# Patient Record
Sex: FEMALE | URBAN - METROPOLITAN AREA
[De-identification: names, ages, dates, MRNs, and addresses within clinical notes are randomized per-mention and may not be internally consistent; named-entity substitution may affect disease eponyms.]

---

## 2024-09-20 ENCOUNTER — OFFICE VISIT (OUTPATIENT)
Dept: URBAN - METROPOLITAN AREA CLINIC 92 | Facility: CLINIC | Age: 49
End: 2024-09-20

## 2024-09-26 ENCOUNTER — DASHBOARD ENCOUNTERS (OUTPATIENT)
Age: 49
End: 2024-09-26

## 2024-09-26 ENCOUNTER — OFFICE VISIT (OUTPATIENT)
Dept: URBAN - METROPOLITAN AREA CLINIC 25 | Facility: CLINIC | Age: 49
End: 2024-09-26
Payer: COMMERCIAL

## 2024-09-26 VITALS
HEART RATE: 95 BPM | RESPIRATION RATE: 96 BRPM | SYSTOLIC BLOOD PRESSURE: 135 MMHG | TEMPERATURE: 97.9 F | BODY MASS INDEX: 53.92 KG/M2 | DIASTOLIC BLOOD PRESSURE: 84 MMHG | WEIGHT: 293 LBS | HEIGHT: 62 IN

## 2024-09-26 DIAGNOSIS — K90.49 FOOD INTOLERANCE IN ADULT: ICD-10-CM

## 2024-09-26 DIAGNOSIS — R14.0 BLOATING: ICD-10-CM

## 2024-09-26 DIAGNOSIS — R10.33 PERIUMBILICAL PAIN: ICD-10-CM

## 2024-09-26 PROBLEM — 235719002: Status: ACTIVE | Noted: 2024-09-26

## 2024-09-26 PROCEDURE — 99204 OFFICE O/P NEW MOD 45 MIN: CPT | Performed by: INTERNAL MEDICINE

## 2024-09-26 RX ORDER — VALSARTAN 320 MG/1
TABLET, FILM COATED ORAL
Qty: 30 TABLET | Status: ON HOLD | COMMUNITY

## 2024-09-26 RX ORDER — AMLODIPINE BESYLATE 10 MG/1
TABLET ORAL
Qty: 30 TABLET | Status: ACTIVE | COMMUNITY

## 2024-09-26 NOTE — HPI-TODAY'S VISIT:
47 yo pt presents for evaluation of lower abdominal pain. She reports treatment for h. pylori 2019 after egd positive biopsy. Recent h. pylori breath test is negative. She admits to bloating/gas.

## 2025-06-02 ENCOUNTER — TELEPHONE ENCOUNTER (OUTPATIENT)
Dept: URBAN - METROPOLITAN AREA CLINIC 25 | Facility: CLINIC | Age: 50
End: 2025-06-02

## 2025-06-02 ENCOUNTER — OFFICE VISIT (OUTPATIENT)
Dept: URBAN - METROPOLITAN AREA TELEHEALTH 2 | Facility: TELEHEALTH | Age: 50
End: 2025-06-02
Payer: COMMERCIAL

## 2025-06-02 DIAGNOSIS — Z12.11 COLON CANCER SCREENING: ICD-10-CM

## 2025-06-02 PROCEDURE — 99202 OFFICE O/P NEW SF 15 MIN: CPT

## 2025-06-02 RX ORDER — AMLODIPINE BESYLATE 10 MG/1
TABLET ORAL
Qty: 30 TABLET | Status: ACTIVE | COMMUNITY

## 2025-06-02 RX ORDER — VALSARTAN 320 MG/1
TABLET, FILM COATED ORAL
Qty: 30 TABLET | Status: ON HOLD | COMMUNITY

## 2025-06-02 NOTE — HPI-TODAY'S VISIT:
Ms. Murguia is a 49y F, she presents to the clinic for colon cancer screening  No change in bowel habits No change in weight No change in appetite No n/v No BRBPR, no Melena No abdominal pain No SOB, no CP Last colonoscopy: N/A  No known family h/o colon cancer/polyps  Denies cardiac hardware, dialysis, blood thinner use, and or issues with anesthesia

## 2025-07-14 ENCOUNTER — TELEPHONE ENCOUNTER (OUTPATIENT)
Dept: URBAN - METROPOLITAN AREA CLINIC 84 | Facility: CLINIC | Age: 50
End: 2025-07-14

## 2025-07-15 ENCOUNTER — OFFICE VISIT (OUTPATIENT)
Dept: URBAN - METROPOLITAN AREA MEDICAL CENTER 17 | Facility: MEDICAL CENTER | Age: 50
End: 2025-07-15